# Patient Record
Sex: FEMALE | Race: OTHER | HISPANIC OR LATINO | URBAN - METROPOLITAN AREA
[De-identification: names, ages, dates, MRNs, and addresses within clinical notes are randomized per-mention and may not be internally consistent; named-entity substitution may affect disease eponyms.]

---

## 2024-10-06 ENCOUNTER — EMERGENCY (EMERGENCY)
Facility: HOSPITAL | Age: 24
LOS: 1 days | Discharge: HOME | End: 2024-10-06
Attending: EMERGENCY MEDICINE | Admitting: EMERGENCY MEDICINE
Payer: COMMERCIAL

## 2024-10-06 VITALS
TEMPERATURE: 98 F | DIASTOLIC BLOOD PRESSURE: 65 MMHG | RESPIRATION RATE: 18 BRPM | SYSTOLIC BLOOD PRESSURE: 127 MMHG | OXYGEN SATURATION: 98 % | WEIGHT: 165.35 LBS | HEART RATE: 85 BPM | HEIGHT: 60 IN

## 2024-10-06 VITALS
TEMPERATURE: 98 F | SYSTOLIC BLOOD PRESSURE: 129 MMHG | DIASTOLIC BLOOD PRESSURE: 86 MMHG | OXYGEN SATURATION: 99 % | RESPIRATION RATE: 18 BRPM | HEART RATE: 78 BPM

## 2024-10-06 LAB
ADD ON TEST-SPECIMEN IN LAB: SIGNIFICANT CHANGE UP
ALBUMIN SERPL ELPH-MCNC: 4.4 G/DL — SIGNIFICANT CHANGE UP (ref 3.3–5)
ALP SERPL-CCNC: 93 U/L — SIGNIFICANT CHANGE UP (ref 40–120)
ALT FLD-CCNC: 18 U/L — SIGNIFICANT CHANGE UP (ref 4–33)
ANION GAP SERPL CALC-SCNC: 11 MMOL/L — SIGNIFICANT CHANGE UP (ref 7–14)
AST SERPL-CCNC: 15 U/L — SIGNIFICANT CHANGE UP (ref 4–32)
BASOPHILS # BLD AUTO: 0.01 K/UL — SIGNIFICANT CHANGE UP (ref 0–0.2)
BASOPHILS NFR BLD AUTO: 0.1 % — SIGNIFICANT CHANGE UP (ref 0–2)
BILIRUB SERPL-MCNC: <0.2 MG/DL — SIGNIFICANT CHANGE UP (ref 0.2–1.2)
BUN SERPL-MCNC: 5 MG/DL — LOW (ref 7–23)
CALCIUM SERPL-MCNC: 9.6 MG/DL — SIGNIFICANT CHANGE UP (ref 8.4–10.5)
CHLORIDE SERPL-SCNC: 101 MMOL/L — SIGNIFICANT CHANGE UP (ref 98–107)
CO2 SERPL-SCNC: 27 MMOL/L — SIGNIFICANT CHANGE UP (ref 22–31)
CREAT SERPL-MCNC: 0.58 MG/DL — SIGNIFICANT CHANGE UP (ref 0.5–1.3)
EGFR: 130 ML/MIN/1.73M2 — SIGNIFICANT CHANGE UP
EOSINOPHIL # BLD AUTO: 0.09 K/UL — SIGNIFICANT CHANGE UP (ref 0–0.5)
EOSINOPHIL NFR BLD AUTO: 0.8 % — SIGNIFICANT CHANGE UP (ref 0–6)
GLUCOSE SERPL-MCNC: 95 MG/DL — SIGNIFICANT CHANGE UP (ref 70–99)
HCT VFR BLD CALC: 43.2 % — SIGNIFICANT CHANGE UP (ref 34.5–45)
HGB BLD-MCNC: 13.8 G/DL — SIGNIFICANT CHANGE UP (ref 11.5–15.5)
IANC: 7.41 K/UL — HIGH (ref 1.8–7.4)
IMM GRANULOCYTES NFR BLD AUTO: 0.4 % — SIGNIFICANT CHANGE UP (ref 0–0.9)
LYMPHOCYTES # BLD AUTO: 28 % — SIGNIFICANT CHANGE UP (ref 13–44)
LYMPHOCYTES # BLD AUTO: 3.15 K/UL — SIGNIFICANT CHANGE UP (ref 1–3.3)
MCHC RBC-ENTMCNC: 26.8 PG — LOW (ref 27–34)
MCHC RBC-ENTMCNC: 31.9 GM/DL — LOW (ref 32–36)
MCV RBC AUTO: 83.9 FL — SIGNIFICANT CHANGE UP (ref 80–100)
MONOCYTES # BLD AUTO: 0.55 K/UL — SIGNIFICANT CHANGE UP (ref 0–0.9)
MONOCYTES NFR BLD AUTO: 4.9 % — SIGNIFICANT CHANGE UP (ref 2–14)
NEUTROPHILS # BLD AUTO: 7.41 K/UL — HIGH (ref 1.8–7.4)
NEUTROPHILS NFR BLD AUTO: 65.8 % — SIGNIFICANT CHANGE UP (ref 43–77)
NRBC # BLD: 0 /100 WBCS — SIGNIFICANT CHANGE UP (ref 0–0)
NRBC # FLD: 0 K/UL — SIGNIFICANT CHANGE UP (ref 0–0)
PLATELET # BLD AUTO: 325 K/UL — SIGNIFICANT CHANGE UP (ref 150–400)
POTASSIUM SERPL-MCNC: 3.8 MMOL/L — SIGNIFICANT CHANGE UP (ref 3.5–5.3)
POTASSIUM SERPL-SCNC: 3.8 MMOL/L — SIGNIFICANT CHANGE UP (ref 3.5–5.3)
PROT SERPL-MCNC: 7.6 G/DL — SIGNIFICANT CHANGE UP (ref 6–8.3)
RBC # BLD: 5.15 M/UL — SIGNIFICANT CHANGE UP (ref 3.8–5.2)
RBC # FLD: 11.5 % — SIGNIFICANT CHANGE UP (ref 10.3–14.5)
SODIUM SERPL-SCNC: 139 MMOL/L — SIGNIFICANT CHANGE UP (ref 135–145)
WBC # BLD: 11.25 K/UL — HIGH (ref 3.8–10.5)
WBC # FLD AUTO: 11.25 K/UL — HIGH (ref 3.8–10.5)

## 2024-10-06 PROCEDURE — 71046 X-RAY EXAM CHEST 2 VIEWS: CPT | Mod: 26

## 2024-10-06 PROCEDURE — 99284 EMERGENCY DEPT VISIT MOD MDM: CPT

## 2024-10-06 RX ORDER — METOCLOPRAMIDE HCL 5 MG
10 TABLET ORAL ONCE
Refills: 0 | Status: COMPLETED | OUTPATIENT
Start: 2024-10-06 | End: 2024-10-06

## 2024-10-06 RX ORDER — KETOROLAC TROMETHAMINE 10 MG/1
15 TABLET, FILM COATED ORAL ONCE
Refills: 0 | Status: DISCONTINUED | OUTPATIENT
Start: 2024-10-06 | End: 2024-10-06

## 2024-10-06 RX ORDER — SODIUM CHLORIDE IRRIG SOLUTION 0.9 %
1000 SOLUTION, IRRIGATION IRRIGATION ONCE
Refills: 0 | Status: COMPLETED | OUTPATIENT
Start: 2024-10-06 | End: 2024-10-06

## 2024-10-06 RX ADMIN — Medication 10 MILLIGRAM(S): at 16:19

## 2024-10-06 RX ADMIN — KETOROLAC TROMETHAMINE 15 MILLIGRAM(S): 10 TABLET, FILM COATED ORAL at 16:19

## 2024-10-06 RX ADMIN — Medication 1000 MILLILITER(S): at 15:47

## 2024-10-06 NOTE — ED PROVIDER NOTE - CLINICAL SUMMARY MEDICAL DECISION MAKING FREE TEXT BOX
24F with no significant PMH c/o headache. 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 Patient is well appearing. Will get labs and cxr to assess for active TB and dc.

## 2024-10-06 NOTE — ED PROVIDER NOTE - ATTENDING CONTRIBUTION TO CARE
Brief HPI:  25 yo F currently on rifampin therapy for latent TB presents for headache, nausea.  Patient states headache started today, non-acute onset, non-maximal onset, throbbing, associated with nausea without vomiting.  Denies photophobia or neck stiffness.  Patient is also concerned that her latent TB may have become active, however denies night sweats, fever, weight loss, cough.  Traveled from [place] ~3 months ago.  Not on ocp.  Denies etoh or illicit drug use.  Denies fever, chills, numbness, tingling, weakness, visual or speech change, balance problems.     Vitals:   Reviewed    Exam:    GEN:  Non-toxic appearing, non-distressed, speaking full sentences, non-diaphoretic, AAOx3  HEENT:  NCAT, neck supple, EOMI, PERRLA, sclera anicteric, no conjunctival pallor or injection, no stridor, normal voice, no tonsillar exudate, uvula midline  CV:  regular rhythm and rate, s1/s2 audible, no murmurs, rubs or gallops, peripheral pulses 2+ and symmetric  PULM:  non-labored respirations, lungs clear to auscultation bilaterally, no wheezes, crackles or rales  ABD:  non distended, non-tender, no rebound, no guarding, negative Lugo's sign, bowel sounds normal, no cvat  MSK:  no gross deformity, non-tender extremities and joints, range of motion grossly normal appearing, no extremity edema, extremities warm and well perfused   NEURO:  AAOx3, CN II-XII intact, motor 5/5 in upper and lower extremities bilaterally, sensation grossly intact in extremities and trunk, finger to nose testing wnl, no nystagmus, negative Romberg, no pronator drift, no gait deficit  SKIN:  warm, dry, no rash or vesicles     A/P:  25 yo F currently on rifampin therapy for latent TB presents for headache, nausea.  Low concern for subarachnoid hemorrhage as headache not acute in onset, not maximal in onset, and is without associated photophobia or neck stiffness.  Low concern for meningitis as patient without fever, photophobia, or neck stiffness.  Suspect primary headache syndrome.  Patient requesting testing for active TB, although very low concern given presentation.  Will obtain labs, cxr, supportive care.  Disposition pending.

## 2024-10-06 NOTE — ED PROVIDER NOTE - ED STEMI HIDDEN
Detail Level: Simple
Price (Do Not Change): 0.00
hide
Instructions: This plan will send the code FBSD to the PM system.  DO NOT or CHANGE the price.

## 2024-10-06 NOTE — ED ADULT NURSE NOTE - OBJECTIVE STATEMENT
Pt received to intake. pt speaks primarily Telugu.  used.  pt is AxO 3 and ambulatory. pt c/o headaches x today. pt endorses being + for TB. pt endorses being treated for it. Respirations even and unlabored. Pt denies headaches, dizziness, n/v/d, abdominal pain, SOB, chest pain, or fever like symptoms. pt has a 20G to the left AC. pt labs obtained and sen to the lab. plan of care ongoing.

## 2024-10-06 NOTE — ED PROVIDER NOTE - OBJECTIVE STATEMENT
24F with no significant PMH c/o headache since yesterday. She traveled from the [facility] 3 months ago and was prescribed rifampin on 9/24 for latent TB. She took the rifampin on empty stomach yesterday which caused her to become nauseous and vomit leading to a pulsatile R sided headache. 24F c/o headache. She traveled from the [place] 3 months ago and was prescribed rifampin on 9/24 for latent TB. She took the rifampin on empty stomach yesterday which caused her to become nauseous and vomit leading to a pulsatile R sided headache.

## 2024-10-06 NOTE — ED PROVIDER NOTE - PHYSICAL EXAMINATION
Physical Exam:  Gen: NAD, AOx4, non-toxic appearing  HEENT: normal conjunctiva, tongue midline, oral mucosa moist  Lung: CTAB, no respiratory distress, speaking in full sentences  CV: RRR, no murmurs, rubs or gallops  Abd: soft, NT, ND, no CVA tenderness   MSK: no visible deformities, no back pain  Neuro: No focal sensory or motor deficits  Skin: Warm, well perfused

## 2024-10-06 NOTE — ED PROVIDER NOTE - NSFOLLOWUPINSTRUCTIONS_ED_ALL_ED_FT
Tuberculosis  Tuberculosis (TB) is an infection that usually affects the lungs but can affect other parts of the body. It is caused by bacteria. There are two forms of TB:  Active TB, also called TB disease. This means that you have TB symptoms and your infection can spread to another person (you are contagious).  Latent TB. This means that you do not have any symptoms of TB and you are not contagious.  Latent TB can turn into active TB, so it is important to get treatment no matter which form of TB you have.    What are the causes?  TB is caused by bacteria called Mycobacterium tuberculosis. You can catch the bacteria by breathing in droplets from a cough or sneeze from someone who has active TB.    What increases the risk?  You are more likely to develop TB if you:  Have human immunodeficiency virus (HIV) or AIDS.  Have diabetes.  Are older. Infants are also more likely to get TB.  Have an alcohol use disorder.  Use tobacco.  Live in or travel to areas that have high rates of TB, such as:  [place].  [place].  [place].  [place].  [place].  [place].  [place].  Live or work in areas that may be overcrowded or have poor airflow (ventilation), including:  Health care facilities.  Residential care facilities, such as an assisted living facility.  Refugee camps or shelters for people who are experiencing homelessness.  What are the signs or symptoms?  Symptoms of this condition include:  Coughing up blood, mucus from the lungs (sputum), or both.  A cough that lasts three weeks or longer.  Chest pain, or pain while breathing or coughing.  Loss of appetite or unexplained weight loss.  Tiredness (fatigue) and weakness.  Fever, sweating, or chills.  How is this diagnosed?  This condition may be diagnosed based on a physical exam, your symptoms, and your medical history. You may have chest X-rays done. You may also have one or more of the following samples taken and tested for bacteria:  Skin.  Blood.  Sputum.  Urine.  How is this treated?  Both latent and active TB are treated with antibiotic medicine. You may need to take antibiotics for up to 6–9 months.    Follow these instructions at home:  Medicines    Take over-the-counter and prescription medicines as told by your health care provider. Finish all antibiotic medicine even when you start to feel better.  Activity    Rest as needed. Ask your health care provider what activities are safe for you.  Do not go back to work or school until your health care provider approves.  Avoid close contact with others (especially infants and older people) until your health care provider says that you are no longer contagious.  General instructions    A person covering her mouth and nose with a cloth while sneezing or coughing.  Washing hands with soap and water.  Tell your health care provider about all the people you live with or have close contact with. Those people may need to be tested for TB.  Do not use any products that contain nicotine or tobacco. These products include cigarettes, chewing tobacco, and vaping devices, such as e-cigarettes. If you need help quitting, ask your health care provider.  Cover your mouth and nose when you cough or sneeze. Dispose of used tissues as told by your health care provider.  Wash your hands often with soap and water. If soap and water are not available, use hand .  Future TB testing will require blood tests or chest X-rays to check for active TB. Do not have the TB skin test done after having TB or after being exposed to TB.  Keep all follow-up visits. This is important.  Contact a health care provider if:  You have new symptoms.  You lose your appetite.  You feel nauseous or you vomit.  Your urine is dark yellow.  Your skin or the white part of your eyes turns a yellowish color (jaundice).  Your symptoms get worse or do not go away with treatment.  You have a fever.  Get help right away if:  You have chest pain.  You cough up blood.  You have trouble breathing or feel short of breath.  You have a headache or a stiff neck.  Summary  Tuberculosis (TB) is an infection that usually affects the lungs but can affect other parts of the body. It is caused by bacteria.  The two forms of TB are active TB and latent TB. If you have active TB, your infection can spread to another person (you are contagious).  Latent TB can turn into active TB, so it is important to get treatment no matter which form of TB you have.  TB is treated with antibiotic medicine. You may need to take antibiotics for up to 6–9 months.

## 2024-10-06 NOTE — ED ADULT TRIAGE NOTE - CHIEF COMPLAINT QUOTE
pt on rifampin for TB,  c/o of headaches for 2 days, pt denies any head trauma no neuro deficits noted, co of occasional nausea and vomiting

## 2024-10-10 LAB
GAMMA INTERFERON BACKGROUND BLD IA-ACNC: 0.06 IU/ML — SIGNIFICANT CHANGE UP
M TB IFN-G BLD-IMP: NEGATIVE — SIGNIFICANT CHANGE UP
M TB IFN-G CD4+ BCKGRND COR BLD-ACNC: 0.01 IU/ML — SIGNIFICANT CHANGE UP
M TB IFN-G CD4+CD8+ BCKGRND COR BLD-ACNC: 0.01 IU/ML — SIGNIFICANT CHANGE UP
QUANT TB PLUS MITOGEN MINUS NIL: 3.29 IU/ML — SIGNIFICANT CHANGE UP

## 2024-10-14 NOTE — ED POST DISCHARGE NOTE - REASON FOR FOLLOW-UP
Other Received result from lab. Quantiferon negative, but testing was performed outside of window for lab. Lab recommended repeat testing be performed. Attempted to reach out to patient at both phone numbers listed in chart ([phone]-not in service and [phone]-wrong number)with  [phone] Cristobal. Per chart review patient is taking rifampin for latent TB. CXR without signs of acute TB.
